# Patient Record
Sex: FEMALE | Race: AMERICAN INDIAN OR ALASKA NATIVE | NOT HISPANIC OR LATINO | Employment: FULL TIME | URBAN - METROPOLITAN AREA
[De-identification: names, ages, dates, MRNs, and addresses within clinical notes are randomized per-mention and may not be internally consistent; named-entity substitution may affect disease eponyms.]

---

## 2022-04-30 ENCOUNTER — HOSPITAL ENCOUNTER (EMERGENCY)
Facility: MEDICAL CENTER | Age: 22
End: 2022-04-30
Attending: EMERGENCY MEDICINE

## 2022-04-30 VITALS
HEIGHT: 64 IN | TEMPERATURE: 98.3 F | HEART RATE: 94 BPM | SYSTOLIC BLOOD PRESSURE: 132 MMHG | OXYGEN SATURATION: 96 % | DIASTOLIC BLOOD PRESSURE: 80 MMHG | WEIGHT: 182.1 LBS | RESPIRATION RATE: 16 BRPM | BODY MASS INDEX: 31.09 KG/M2

## 2022-04-30 DIAGNOSIS — R11.2 NAUSEA AND VOMITING, INTRACTABILITY OF VOMITING NOT SPECIFIED, UNSPECIFIED VOMITING TYPE: ICD-10-CM

## 2022-04-30 PROCEDURE — 99283 EMERGENCY DEPT VISIT LOW MDM: CPT

## 2022-04-30 PROCEDURE — 700111 HCHG RX REV CODE 636 W/ 250 OVERRIDE (IP): Performed by: EMERGENCY MEDICINE

## 2022-04-30 RX ORDER — ONDANSETRON 4 MG/1
4 TABLET, ORALLY DISINTEGRATING ORAL ONCE
Status: COMPLETED | OUTPATIENT
Start: 2022-04-30 | End: 2022-04-30

## 2022-04-30 RX ORDER — ONDANSETRON 4 MG/1
4 TABLET, ORALLY DISINTEGRATING ORAL EVERY 8 HOURS PRN
Qty: 5 TABLET | Refills: 1 | Status: SHIPPED | OUTPATIENT
Start: 2022-04-30

## 2022-04-30 RX ADMIN — ONDANSETRON 4 MG: 4 TABLET, ORALLY DISINTEGRATING ORAL at 12:54

## 2022-04-30 NOTE — ED TRIAGE NOTES
"Chief Complaint   Patient presents with   • Nausea     Sent home from work after she became lightheaded and was vomiting. Unable to keep anything down since 3am       Patient to triage ambualtory with a steady gait, AAOx4, Appropriate precautions in place.     Explained wait time and triage process. Placed back in lobby. Told to notify ED tech or RN of any changes, verbalized understanding.    /84   Pulse 91   Temp 36 °C (96.8 °F) (Temporal)   Resp 15   Ht 1.626 m (5' 4\")   Wt 82.6 kg (182 lb 1.6 oz)   LMP 03/21/2022   SpO2 98%   BMI 31.26 kg/m²     "

## 2022-04-30 NOTE — ED PROVIDER NOTES
"ED Provider Note    Scribed for Vladimir Phillips M.D. by Victoria Griggs. 4/30/2022  12:26 PM    Primary care provider: None noted  Means of arrival: Walk-In  History obtained from: Patient  History limited by: None    CHIEF COMPLAINT  Chief Complaint   Patient presents with    Nausea     Sent home from work after she became lightheaded and was vomiting. Unable to keep anything down since 3am       HPI  Hardik Molina is a 22 y.o. female who presents to the Emergency Department for acute onset of nausea at 3 AM this morning. She was sent home from work after she became lightheaded. She denies diarrhea, fevers, or chills. No alleviating factors were reported. She denies any possibilities of being pregnant.     REVIEW OF SYSTEMS  Pertinent positives include nausea.   Pertinent negatives include no diarrhea, fevers, or chills.    See HPI for further details.     PAST MEDICAL HISTORY   None noted    SURGICAL HISTORY  patient denies any surgical history    SOCIAL HISTORY  Social History     Tobacco Use    Smoking status: Former Smoker    Smokeless tobacco: Never Used   Substance Use Topics    Alcohol use: Never    Drug use: Never      Social History     Substance and Sexual Activity   Drug Use Never       FAMILY HISTORY  History reviewed. No pertinent family history.    CURRENT MEDICATIONS  Home Medications       Reviewed by Christiana Stockton R.N. (Registered Nurse) on 04/30/22 at 1204  Med List Status: Partial     Medication Last Dose Status        Patient Clifford Taking any Medications                           ALLERGIES  None noted    PHYSICAL EXAM  VITAL SIGNS: /84   Pulse 91   Temp 36 °C (96.8 °F) (Temporal)   Resp 15   Ht 1.626 m (5' 4\")   Wt 82.6 kg (182 lb 1.6 oz)   LMP 03/21/2022   SpO2 98%   BMI 31.26 kg/m²     Nursing note and vitals reviewed.  Constitutional: Well-developed and well-nourished. No distress. Denies possibility of pregnancy.   HENT: Head is normocephalic and atraumatic. " Oropharynx is clear and moist without exudate or erythema.   Eyes: Pupils are equal, round, and reactive to light. Conjunctiva are normal.   Cardiovascular: Normal rate and regular rhythm. No murmur heard. Normal radial pulses.  Pulmonary/Chest: Breath sounds normal. No wheezes or rales.   Abdominal: Soft. No distention  Unable to elicit any abdominal tenderness.  Musculoskeletal: Extremities exhibit normal range of motion without edema or tenderness.   Neurological: Awake, alert and oriented to person, place, and time. No focal deficits noted.  Skin: Skin is warm and dry. No rash.   Psychiatric: Normal mood and affect. Appropriate for clinical situation.    COURSE & MEDICAL DECISION MAKING  Nursing notes, VS, PMSFHx reviewed in chart.      12:26 PM - Patient seen and examined at bedside. Patient will be treated with Zofran 4 mg. The patient presents today with nausea and vomiting. The patient has a benign abdominal exam. There is no tenderness to make me concerned for more serious intra-abdominal pathology. The patient was treated with Zofran for nausea. On reassessment the patient was feeling better. The patient continued to have a nonsurgical abdomen. Overall the patient is improved and will be discharged home with a prescription of Zofran. I feel that this patient is a good outpatient treatment candidate. I recommended that the patient return to the emergency department should they have any abdominal discomfort does not resolve within 24 hours. Symptoms occurred in the same context as multiple people in the emergency room.     The patient will return for new or worsening symptoms and is stable at the time of discharge.    DISPOSITION:  Patient will be discharged home in stable condition.    FOLLOW UP:  [facility], Emergency Dept  [address] [facility] [address] [phone]    If symptoms worsen      OUTPATIENT MEDICATIONS:  New Prescriptions    ONDANSETRON (ZOFRAN ODT) 4 MG TABLET  DISPERSIBLE    Take 1 Tablet by mouth every 8 hours as needed for Nausea.        FINAL IMPRESSION  1. Nausea and vomiting, intractability of vomiting not specified, unspecified vomiting type          I, Victoria Griggs ([place]), am scribing for, and in the presence of, Vladimir Phillips M.D..    Electronically signed by: Victoria Griggs (Radha), 4/30/2022    IVladimir M.D. personally performed the services described in this documentation, as scribed by Victoria Griggs in my presence, and it is both accurate and complete.    The note accurately reflects work and decisions made by me.  Vladimir Phillips M.D.  4/30/2022  1:21 PM

## 2022-04-30 NOTE — ED NOTES
Dischared instructions reviewed with the patient. Pt voices understanding of all prescriptions and follow up.

## 2022-04-30 NOTE — ED NOTES
Pt able to ambulate to RED 12 H from the lobby with steady gait, pt changed into gown. Chart up for ERP.

## 2023-12-09 ENCOUNTER — OFFICE VISIT (OUTPATIENT)
Dept: URGENT CARE | Facility: PHYSICIAN GROUP | Age: 23
End: 2023-12-09
Payer: COMMERCIAL

## 2023-12-09 VITALS
SYSTOLIC BLOOD PRESSURE: 122 MMHG | WEIGHT: 193 LBS | TEMPERATURE: 98 F | DIASTOLIC BLOOD PRESSURE: 84 MMHG | RESPIRATION RATE: 14 BRPM | OXYGEN SATURATION: 97 % | BODY MASS INDEX: 32.95 KG/M2 | HEIGHT: 64 IN | HEART RATE: 74 BPM

## 2023-12-09 DIAGNOSIS — R05.1 ACUTE COUGH: ICD-10-CM

## 2023-12-09 DIAGNOSIS — N94.10 DYSPAREUNIA IN FEMALE: ICD-10-CM

## 2023-12-09 DIAGNOSIS — J06.9 VIRAL URI WITH COUGH: ICD-10-CM

## 2023-12-09 LAB
FLUAV RNA SPEC QL NAA+PROBE: NEGATIVE
FLUBV RNA SPEC QL NAA+PROBE: NEGATIVE
RSV RNA SPEC QL NAA+PROBE: NEGATIVE
SARS-COV-2 RNA RESP QL NAA+PROBE: NEGATIVE

## 2023-12-09 PROCEDURE — 99202 OFFICE O/P NEW SF 15 MIN: CPT | Performed by: NURSE PRACTITIONER

## 2023-12-09 PROCEDURE — 0241U POCT CEPHEID COV-2, FLU A/B, RSV - PCR: CPT | Performed by: NURSE PRACTITIONER

## 2023-12-09 PROCEDURE — 3074F SYST BP LT 130 MM HG: CPT | Performed by: NURSE PRACTITIONER

## 2023-12-09 PROCEDURE — 3079F DIAST BP 80-89 MM HG: CPT | Performed by: NURSE PRACTITIONER

## 2023-12-09 ASSESSMENT — ENCOUNTER SYMPTOMS
DIARRHEA: 0
SPUTUM PRODUCTION: 0
SHORTNESS OF BREATH: 0
HEADACHES: 0
SORE THROAT: 0
CHILLS: 0
EYE DISCHARGE: 0
ORTHOPNEA: 0
NAUSEA: 0
COUGH: 1
MYALGIAS: 0
FEVER: 0
WHEEZING: 0

## 2023-12-09 NOTE — PROGRESS NOTES
Subjective     Hardik Molina is a 23 y.o. female who presents with Cough, Congestion (X 2-3 days, father has RSV and mother has pneumonia  ), and Vaginal Pain (X 2 wks only during intercourse )            HPI  New problem.  Patient is a 23-year-old female who presents with a 2 to 3-day history of cough, and congestion.  She also has had a 2-week history of vaginal pain only during intercourse.  She denies fever, chills, myalgia, headache, or bodyaches.  She reports that her grandmother was just hospitalized last night with RSV induced pneumonia and her father has had RSV as well this week.  She has been taking over-the-counter medications for her symptoms.  As far as the pain with intercourse that she has not had any discharge, abdominal pain, or back pain.    Patient has no known allergies.  Current Outpatient Medications on File Prior to Visit   Medication Sig Dispense Refill    ondansetron (ZOFRAN ODT) 4 MG TABLET DISPERSIBLE Take 1 Tablet by mouth every 8 hours as needed for Nausea. (Patient not taking: Reported on 12/9/2023) 5 Tablet 1     No current facility-administered medications on file prior to visit.     Social History     Socioeconomic History    Marital status: Single     Spouse name: Not on file    Number of children: Not on file    Years of education: Not on file    Highest education level: Not on file   Occupational History    Not on file   Tobacco Use    Smoking status: Former    Smokeless tobacco: Never   Substance and Sexual Activity    Alcohol use: Never    Drug use: Never    Sexual activity: Not on file   Other Topics Concern    Not on file   Social History Narrative    Not on file     Social Determinants of Health     Financial Resource Strain: Not on file   Food Insecurity: Not on file   Transportation Needs: Not on file   Physical Activity: Not on file   Stress: Not on file   Social Connections: Not on file   Intimate Partner Violence: Not on file   Housing Stability: Not on file  "    Breast Cancer-related family history is not on file.      Review of Systems   Constitutional:  Positive for malaise/fatigue. Negative for chills and fever.   HENT:  Positive for congestion. Negative for sore throat.    Eyes:  Negative for discharge.   Respiratory:  Positive for cough. Negative for sputum production, shortness of breath and wheezing.    Cardiovascular:  Negative for chest pain and orthopnea.   Gastrointestinal:  Negative for diarrhea and nausea.   Musculoskeletal:  Negative for myalgias.   Neurological:  Negative for headaches.   Endo/Heme/Allergies:  Negative for environmental allergies.   All other systems reviewed and are negative.             Objective     /84   Pulse 74   Temp 36.7 °C (98 °F) (Temporal)   Resp 14   Ht 1.626 m (5' 4\")   Wt 87.5 kg (193 lb)   SpO2 97%   BMI 33.13 kg/m²      Physical Exam  Constitutional:       General: She is not in acute distress.     Appearance: She is well-developed.   HENT:      Head: Normocephalic.      Right Ear: Tympanic membrane and external ear normal.      Left Ear: Tympanic membrane and external ear normal.      Nose: Mucosal edema, congestion and rhinorrhea present.      Mouth/Throat:      Pharynx: No posterior oropharyngeal erythema.   Eyes:      General:         Right eye: No discharge.         Left eye: No discharge.      Conjunctiva/sclera: Conjunctivae normal.   Cardiovascular:      Rate and Rhythm: Normal rate and regular rhythm.      Heart sounds: Normal heart sounds.   Pulmonary:      Effort: Pulmonary effort is normal.      Breath sounds: Normal breath sounds.   Genitourinary:     Comments: Deferred to gyn  Musculoskeletal:         General: Normal range of motion.      Cervical back: Normal range of motion and neck supple.   Lymphadenopathy:      Cervical: No cervical adenopathy.   Skin:     General: Skin is warm and dry.   Neurological:      Mental Status: She is alert and oriented to person, place, and time.   Psychiatric:   "       Behavior: Behavior normal.         Thought Content: Thought content normal.                             Assessment & Plan        1. Viral URI with cough        2. Acute cough  POCT CEPHEID COV-2, FLU A/B, RSV - PCR      3. Dyspareunia in female  Referral to Gynecology        Negative covid, flu and RSV.  Referral to gyn for her pain with intercourse.  Viral illness at this time with no indication for antibiotics. Reviewed with patient expected course of illness and also reviewed OTC medications that may be used for symptom relief. Follow up 7-10 days if not improving.

## 2023-12-26 ENCOUNTER — OFFICE VISIT (OUTPATIENT)
Dept: URGENT CARE | Facility: PHYSICIAN GROUP | Age: 23
End: 2023-12-26
Payer: COMMERCIAL

## 2023-12-26 VITALS
DIASTOLIC BLOOD PRESSURE: 82 MMHG | WEIGHT: 197 LBS | HEART RATE: 68 BPM | OXYGEN SATURATION: 98 % | RESPIRATION RATE: 14 BRPM | HEIGHT: 64 IN | BODY MASS INDEX: 33.63 KG/M2 | TEMPERATURE: 97.2 F | SYSTOLIC BLOOD PRESSURE: 112 MMHG

## 2023-12-26 DIAGNOSIS — J02.9 SORE THROAT: ICD-10-CM

## 2023-12-26 DIAGNOSIS — B34.9 VIRAL ILLNESS: ICD-10-CM

## 2023-12-26 DIAGNOSIS — R68.89 FLU-LIKE SYMPTOMS: ICD-10-CM

## 2023-12-26 LAB
FLUAV RNA SPEC QL NAA+PROBE: NEGATIVE
FLUBV RNA SPEC QL NAA+PROBE: NEGATIVE
RSV RNA SPEC QL NAA+PROBE: NEGATIVE
S PYO DNA SPEC NAA+PROBE: NOT DETECTED
SARS-COV-2 RNA RESP QL NAA+PROBE: NEGATIVE

## 2023-12-26 PROCEDURE — 3074F SYST BP LT 130 MM HG: CPT | Performed by: PHYSICIAN ASSISTANT

## 2023-12-26 PROCEDURE — 0241U POCT CEPHEID COV-2, FLU A/B, RSV - PCR: CPT | Performed by: PHYSICIAN ASSISTANT

## 2023-12-26 PROCEDURE — 99213 OFFICE O/P EST LOW 20 MIN: CPT | Performed by: PHYSICIAN ASSISTANT

## 2023-12-26 PROCEDURE — 87651 STREP A DNA AMP PROBE: CPT | Performed by: PHYSICIAN ASSISTANT

## 2023-12-26 PROCEDURE — 3079F DIAST BP 80-89 MM HG: CPT | Performed by: PHYSICIAN ASSISTANT

## 2023-12-26 ASSESSMENT — ENCOUNTER SYMPTOMS
VOMITING: 0
SPUTUM PRODUCTION: 0
COUGH: 1
NAUSEA: 0
CHILLS: 1
HEMOPTYSIS: 0
SHORTNESS OF BREATH: 0
SORE THROAT: 1
ABDOMINAL PAIN: 0
WHEEZING: 0
MYALGIAS: 1
FEVER: 1

## 2023-12-26 NOTE — PROGRESS NOTES
"  Subjective:   Hardik Molina is a 23 y.o. female who presents today with   Chief Complaint   Patient presents with    Body Aches    Congestion     X 2 days    Pharyngitis    Fever     X 1 day       Pharyngitis   This is a new problem. The current episode started yesterday. The problem has been unchanged. Maximum temperature: subjective. The pain is mild. Associated symptoms include congestion and coughing. Pertinent negatives include no abdominal pain, shortness of breath or vomiting. Associated symptoms comments: Body aches. She has tried nothing for the symptoms.     Recent hospital exposure to flu and COVID.    PMH:  has no past medical history on file.  MEDS:   Current Outpatient Medications:     ondansetron (ZOFRAN ODT) 4 MG TABLET DISPERSIBLE, Take 1 Tablet by mouth every 8 hours as needed for Nausea. (Patient not taking: Reported on 12/9/2023), Disp: 5 Tablet, Rfl: 1  ALLERGIES: No Known Allergies  SURGHX: No past surgical history on file.  SOCHX:  reports that she has quit smoking. She has never used smokeless tobacco. She reports that she does not drink alcohol and does not use drugs.  FH: Reviewed with patient, not pertinent to this visit.       Review of Systems   Constitutional:  Positive for chills, fever and malaise/fatigue.   HENT:  Positive for congestion and sore throat.    Respiratory:  Positive for cough. Negative for hemoptysis, sputum production, shortness of breath and wheezing.    Gastrointestinal:  Negative for abdominal pain, nausea and vomiting.   Musculoskeletal:  Positive for myalgias.        Objective:   /82   Pulse 68   Temp 36.2 °C (97.2 °F) (Temporal)   Resp 14   Ht 1.626 m (5' 4\")   Wt 89.4 kg (197 lb)   SpO2 98%   BMI 33.81 kg/m²   Physical Exam  Vitals and nursing note reviewed.   Constitutional:       General: She is not in acute distress.     Appearance: Normal appearance. She is well-developed. She is not ill-appearing or toxic-appearing.   HENT:      Head: " Normocephalic and atraumatic.      Right Ear: Hearing, tympanic membrane and ear canal normal.      Left Ear: Hearing, tympanic membrane and ear canal normal.      Mouth/Throat:      Mouth: Mucous membranes are moist.      Pharynx: Uvula midline. Posterior oropharyngeal erythema present. No oropharyngeal exudate or uvula swelling.      Tonsils: No tonsillar exudate or tonsillar abscesses. 2+ on the right. 2+ on the left.   Cardiovascular:      Rate and Rhythm: Normal rate and regular rhythm.      Heart sounds: Normal heart sounds.   Pulmonary:      Effort: Pulmonary effort is normal. No respiratory distress.      Breath sounds: Normal breath sounds. No stridor. No wheezing, rhonchi or rales.   Musculoskeletal:      Comments: Normal movement in all 4 extremities   Lymphadenopathy:      Head:      Right side of head: Tonsillar adenopathy present.      Left side of head: Tonsillar adenopathy present.   Skin:     General: Skin is warm and dry.   Neurological:      Mental Status: She is alert.      Coordination: Coordination normal.   Psychiatric:         Mood and Affect: Mood normal.       STREP A -  COVID -   FLU-  RSV -        Assessment/Plan:   Assessment    1. Viral illness    2. Sore throat  - POCT GROUP A STREP, PCR    3. Flu-like symptoms  - POCT CoV-2, Flu A/B, RSV by PCR    Symptoms and presentation consistent with viral illness and we will rule out COVID at this time.  Vital signs are stable on exam today.  Discussed CDC guidelines including self isolation at home.   Patient encouraged to get plenty of rest, use OTC tylenol for pain/fever, and drink plenty of fluids.    Differential diagnosis, natural history, supportive care, and indications for immediate follow-up discussed.   Patient given instructions and understanding of medications and treatment.    If not improving in 3-5 days, F/U with PCP or return to  if symptoms worsen.    Patient agreeable to plan.      Please note that this dictation was created  using voice recognition software. I have made every reasonable attempt to correct obvious errors, but I expect that there are errors of grammar and possibly content that I did not discover before finalizing the note.    Keo Galvan PA-C